# Patient Record
Sex: MALE | Race: WHITE | NOT HISPANIC OR LATINO | URBAN - METROPOLITAN AREA
[De-identification: names, ages, dates, MRNs, and addresses within clinical notes are randomized per-mention and may not be internally consistent; named-entity substitution may affect disease eponyms.]

---

## 2018-05-06 ENCOUNTER — HOSPITAL ENCOUNTER (INPATIENT)
Facility: HOSPITAL | Age: 38
LOS: 3 days | Discharge: HOME | DRG: 137 | End: 2018-05-09
Attending: EMERGENCY MEDICINE | Admitting: EMERGENCY MEDICINE
Payer: COMMERCIAL

## 2018-05-06 DIAGNOSIS — R07.9 CHEST PAIN: ICD-10-CM

## 2018-05-06 DIAGNOSIS — I10 ESSENTIAL HYPERTENSION: ICD-10-CM

## 2018-05-06 DIAGNOSIS — L02.01 FACIAL ABSCESS: Primary | ICD-10-CM

## 2018-05-06 DIAGNOSIS — L02.11 ABSCESS OF NECK: ICD-10-CM

## 2018-05-06 DIAGNOSIS — E11.9 DIABETES MELLITUS TYPE II, NON INSULIN DEPENDENT: ICD-10-CM

## 2018-05-06 PROBLEM — E13.9 DIABETES 1.5, MANAGED AS TYPE 2: Chronic | Status: ACTIVE | Noted: 2018-05-06

## 2018-05-06 LAB
ALBUMIN SERPL BCP-MCNC: 3.3 G/DL
ALP SERPL-CCNC: 119 U/L
ALT SERPL W/O P-5'-P-CCNC: 41 U/L
ANION GAP SERPL CALC-SCNC: 10 MMOL/L
AST SERPL-CCNC: 23 U/L
BASOPHILS # BLD AUTO: 0.03 K/UL
BASOPHILS NFR BLD: 0.2 %
BILIRUB SERPL-MCNC: 0.5 MG/DL
BUN SERPL-MCNC: 7 MG/DL
CALCIUM SERPL-MCNC: 8.7 MG/DL
CHLORIDE SERPL-SCNC: 96 MMOL/L
CO2 SERPL-SCNC: 22 MMOL/L
CREAT SERPL-MCNC: 0.9 MG/DL
DIFFERENTIAL METHOD: ABNORMAL
EOSINOPHIL # BLD AUTO: 0.1 K/UL
EOSINOPHIL NFR BLD: 0.5 %
ERYTHROCYTE [DISTWIDTH] IN BLOOD BY AUTOMATED COUNT: 13.6 %
EST. GFR  (AFRICAN AMERICAN): >60 ML/MIN/1.73 M^2
EST. GFR  (NON AFRICAN AMERICAN): >60 ML/MIN/1.73 M^2
GLUCOSE SERPL-MCNC: 285 MG/DL (ref 70–110)
GLUCOSE SERPL-MCNC: 431 MG/DL (ref 70–110)
GLUCOSE SERPL-MCNC: 432 MG/DL
HCT VFR BLD AUTO: 41.6 %
HGB BLD-MCNC: 14.4 G/DL
IMM GRANULOCYTES # BLD AUTO: 0.07 K/UL
IMM GRANULOCYTES NFR BLD AUTO: 0.5 %
LYMPHOCYTES # BLD AUTO: 1.7 K/UL
LYMPHOCYTES NFR BLD: 13.3 %
MCH RBC QN AUTO: 29.3 PG
MCHC RBC AUTO-ENTMCNC: 34.6 G/DL
MCV RBC AUTO: 85 FL
MONOCYTES # BLD AUTO: 1 K/UL
MONOCYTES NFR BLD: 7.7 %
NEUTROPHILS # BLD AUTO: 9.9 K/UL
NEUTROPHILS NFR BLD: 77.8 %
NRBC BLD-RTO: 0 /100 WBC
PLATELET # BLD AUTO: 278 K/UL
PMV BLD AUTO: 10.8 FL
POCT GLUCOSE: 200 MG/DL (ref 70–110)
POCT GLUCOSE: 285 MG/DL (ref 70–110)
POCT GLUCOSE: 431 MG/DL (ref 70–110)
POTASSIUM SERPL-SCNC: 4.3 MMOL/L
PROT SERPL-MCNC: 7.3 G/DL
RBC # BLD AUTO: 4.92 M/UL
SODIUM SERPL-SCNC: 128 MMOL/L
WBC # BLD AUTO: 12.81 K/UL

## 2018-05-06 PROCEDURE — 99285 EMERGENCY DEPT VISIT HI MDM: CPT | Mod: 25

## 2018-05-06 PROCEDURE — G0378 HOSPITAL OBSERVATION PER HR: HCPCS

## 2018-05-06 PROCEDURE — 70491 CT SOFT TISSUE NECK W/DYE: CPT | Mod: TC

## 2018-05-06 PROCEDURE — 96361 HYDRATE IV INFUSION ADD-ON: CPT

## 2018-05-06 PROCEDURE — 85025 COMPLETE CBC W/AUTO DIFF WBC: CPT

## 2018-05-06 PROCEDURE — 63600175 PHARM REV CODE 636 W HCPCS: Performed by: EMERGENCY MEDICINE

## 2018-05-06 PROCEDURE — 25000003 PHARM REV CODE 250: Performed by: INTERNAL MEDICINE

## 2018-05-06 PROCEDURE — 63600175 PHARM REV CODE 636 W HCPCS

## 2018-05-06 PROCEDURE — 63600175 PHARM REV CODE 636 W HCPCS: Performed by: INTERNAL MEDICINE

## 2018-05-06 PROCEDURE — 83036 HEMOGLOBIN GLYCOSYLATED A1C: CPT

## 2018-05-06 PROCEDURE — 80053 COMPREHEN METABOLIC PANEL: CPT

## 2018-05-06 PROCEDURE — 25000003 PHARM REV CODE 250: Performed by: EMERGENCY MEDICINE

## 2018-05-06 PROCEDURE — 96366 THER/PROPH/DIAG IV INF ADDON: CPT

## 2018-05-06 PROCEDURE — 96372 THER/PROPH/DIAG INJ SC/IM: CPT | Mod: 59

## 2018-05-06 PROCEDURE — 82962 GLUCOSE BLOOD TEST: CPT

## 2018-05-06 PROCEDURE — 25500020 PHARM REV CODE 255: Performed by: EMERGENCY MEDICINE

## 2018-05-06 PROCEDURE — 96375 TX/PRO/DX INJ NEW DRUG ADDON: CPT

## 2018-05-06 PROCEDURE — S0077 INJECTION, CLINDAMYCIN PHOSP: HCPCS | Performed by: INTERNAL MEDICINE

## 2018-05-06 PROCEDURE — 11000001 HC ACUTE MED/SURG PRIVATE ROOM

## 2018-05-06 PROCEDURE — 87040 BLOOD CULTURE FOR BACTERIA: CPT

## 2018-05-06 PROCEDURE — 96365 THER/PROPH/DIAG IV INF INIT: CPT

## 2018-05-06 PROCEDURE — 70491 CT SOFT TISSUE NECK W/DYE: CPT | Mod: 26,,, | Performed by: RADIOLOGY

## 2018-05-06 PROCEDURE — 96374 THER/PROPH/DIAG INJ IV PUSH: CPT

## 2018-05-06 RX ORDER — PIPERACILLIN SODIUM, TAZOBACTAM SODIUM 4; .5 G/20ML; G/20ML
INJECTION, POWDER, LYOPHILIZED, FOR SOLUTION INTRAVENOUS
Status: COMPLETED
Start: 2018-05-06 | End: 2018-05-06

## 2018-05-06 RX ORDER — VANCOMYCIN 2 GRAM/500 ML IN 0.9 % SODIUM CHLORIDE INTRAVENOUS
2000
Status: DISCONTINUED | OUTPATIENT
Start: 2018-05-06 | End: 2018-05-06

## 2018-05-06 RX ORDER — GLUCAGON 1 MG
1 KIT INJECTION
Status: DISCONTINUED | OUTPATIENT
Start: 2018-05-06 | End: 2018-05-09 | Stop reason: HOSPADM

## 2018-05-06 RX ORDER — ACETAMINOPHEN 325 MG/1
650 TABLET ORAL EVERY 4 HOURS PRN
Status: DISCONTINUED | OUTPATIENT
Start: 2018-05-06 | End: 2018-05-09 | Stop reason: HOSPADM

## 2018-05-06 RX ORDER — SODIUM CHLORIDE 9 MG/ML
INJECTION, SOLUTION INTRAVENOUS CONTINUOUS
Status: DISCONTINUED | OUTPATIENT
Start: 2018-05-06 | End: 2018-05-07

## 2018-05-06 RX ORDER — MORPHINE SULFATE 4 MG/ML
2 INJECTION, SOLUTION INTRAMUSCULAR; INTRAVENOUS EVERY 4 HOURS PRN
Status: DISCONTINUED | OUTPATIENT
Start: 2018-05-06 | End: 2018-05-09 | Stop reason: HOSPADM

## 2018-05-06 RX ORDER — ONDANSETRON 2 MG/ML
4 INJECTION INTRAMUSCULAR; INTRAVENOUS EVERY 6 HOURS PRN
Status: DISCONTINUED | OUTPATIENT
Start: 2018-05-06 | End: 2018-05-09 | Stop reason: HOSPADM

## 2018-05-06 RX ORDER — CLONIDINE HYDROCHLORIDE 0.1 MG/1
TABLET ORAL
Status: COMPLETED
Start: 2018-05-06 | End: 2018-05-06

## 2018-05-06 RX ORDER — CLINDAMYCIN PHOSPHATE 900 MG/50ML
900 INJECTION, SOLUTION INTRAVENOUS
Status: DISCONTINUED | OUTPATIENT
Start: 2018-05-06 | End: 2018-05-09 | Stop reason: HOSPADM

## 2018-05-06 RX ORDER — METFORMIN HYDROCHLORIDE 500 MG/1
500 TABLET ORAL 2 TIMES DAILY WITH MEALS
COMMUNITY

## 2018-05-06 RX ORDER — ONDANSETRON 2 MG/ML
4 INJECTION INTRAMUSCULAR; INTRAVENOUS
Status: COMPLETED | OUTPATIENT
Start: 2018-05-06 | End: 2018-05-06

## 2018-05-06 RX ORDER — CLONIDINE HYDROCHLORIDE 0.1 MG/1
0.2 TABLET ORAL 2 TIMES DAILY PRN
Status: DISCONTINUED | OUTPATIENT
Start: 2018-05-06 | End: 2018-05-09 | Stop reason: HOSPADM

## 2018-05-06 RX ORDER — MORPHINE SULFATE 4 MG/ML
8 INJECTION, SOLUTION INTRAMUSCULAR; INTRAVENOUS
Status: COMPLETED | OUTPATIENT
Start: 2018-05-06 | End: 2018-05-06

## 2018-05-06 RX ORDER — INSULIN ASPART 100 [IU]/ML
1-10 INJECTION, SOLUTION INTRAVENOUS; SUBCUTANEOUS EVERY 6 HOURS PRN
Status: DISCONTINUED | OUTPATIENT
Start: 2018-05-06 | End: 2018-05-09 | Stop reason: HOSPADM

## 2018-05-06 RX ADMIN — ACETAMINOPHEN 650 MG: 325 TABLET, FILM COATED ORAL at 07:05

## 2018-05-06 RX ADMIN — INSULIN HUMAN 10 UNITS: 100 INJECTION, SOLUTION PARENTERAL at 10:05

## 2018-05-06 RX ADMIN — IOHEXOL 71 ML: 350 INJECTION, SOLUTION INTRAVENOUS at 10:05

## 2018-05-06 RX ADMIN — PIPERACILLIN SODIUM AND TAZOBACTAM SODIUM 4.5 G: 2; .25 INJECTION, POWDER, FOR SOLUTION INTRAVENOUS at 07:05

## 2018-05-06 RX ADMIN — SODIUM CHLORIDE 1000 ML: 9 INJECTION, SOLUTION INTRAVENOUS at 09:05

## 2018-05-06 RX ADMIN — MORPHINE SULFATE 8 MG: 4 INJECTION INTRAVENOUS at 09:05

## 2018-05-06 RX ADMIN — MORPHINE SULFATE 2 MG: 4 INJECTION INTRAVENOUS at 02:05

## 2018-05-06 RX ADMIN — MORPHINE SULFATE 2 MG: 4 INJECTION INTRAVENOUS at 07:05

## 2018-05-06 RX ADMIN — ONDANSETRON 4 MG: 2 INJECTION INTRAMUSCULAR; INTRAVENOUS at 07:05

## 2018-05-06 RX ADMIN — ONDANSETRON 4 MG: 2 INJECTION INTRAMUSCULAR; INTRAVENOUS at 09:05

## 2018-05-06 RX ADMIN — CLINDAMYCIN IN 5 PERCENT DEXTROSE 900 MG: 18 INJECTION, SOLUTION INTRAVENOUS at 03:05

## 2018-05-06 RX ADMIN — PIPERACILLIN SODIUM AND TAZOBACTAM SODIUM 4.5 G: 2; .25 INJECTION, POWDER, FOR SOLUTION INTRAVENOUS at 01:05

## 2018-05-06 RX ADMIN — PIPERACILLIN AND TAZOBACTAM 4.5 G: 4; .5 INJECTION, POWDER, LYOPHILIZED, FOR SOLUTION INTRAVENOUS; PARENTERAL at 08:05

## 2018-05-06 RX ADMIN — CLONIDINE HYDROCHLORIDE 0.2 MG: 0.1 TABLET ORAL at 07:05

## 2018-05-06 RX ADMIN — INSULIN ASPART 2 UNITS: 100 INJECTION, SOLUTION INTRAVENOUS; SUBCUTANEOUS at 04:05

## 2018-05-06 RX ADMIN — CLINDAMYCIN IN 5 PERCENT DEXTROSE 900 MG: 18 INJECTION, SOLUTION INTRAVENOUS at 09:05

## 2018-05-06 RX ADMIN — SODIUM CHLORIDE: 900 INJECTION, SOLUTION INTRAVENOUS at 01:05

## 2018-05-06 NOTE — ED PROVIDER NOTES
Encounter Date: 5/6/2018       History     Chief Complaint   Patient presents with    Facial Swelling     patient drained abscess from same place 2 mo ago.      3 days of progressive left facial pain and swelling with progression of the swelling now to the right submandibular region    Medically non compliant with anti hypertensive meds and oral NIDDM meds    No known fever  No airway symptoms of SOB, stridor     Reports no recent dental procedures  Denies any acute dental pain as potential source of infection              Review of patient's allergies indicates:  No Known Allergies  Past Medical History:   Diagnosis Date    Diabetes mellitus     Hypertension      Past Surgical History:   Procedure Laterality Date    CHOLECYSTECTOMY       History reviewed. No pertinent family history.  Social History   Substance Use Topics    Smoking status: Current Some Day Smoker    Smokeless tobacco: Not on file    Alcohol use No     Review of Systems   Constitutional: Positive for appetite change. Negative for fever.   HENT: Positive for facial swelling. Negative for ear discharge, ear pain, sinus pain, sinus pressure, sneezing, sore throat, tinnitus, trouble swallowing and voice change.    Eyes: Negative.    Respiratory: Negative.    Cardiovascular: Negative.    Gastrointestinal: Negative.    Endocrine: Positive for polydipsia and polyuria.   Genitourinary: Negative for difficulty urinating, dysuria, hematuria and urgency.   Musculoskeletal: Negative.  Negative for neck pain and neck stiffness.   Hematological: Negative.    All other systems reviewed and are negative.      Physical Exam     Initial Vitals [05/06/18 0838]   BP Pulse Resp Temp SpO2   (!) 176/118 (!) 115 20 97.6 °F (36.4 °C) 95 %      MAP       137.33         Physical Exam    Nursing note and vitals reviewed.  Constitutional: He appears well-developed and well-nourished. He is not diaphoretic. No distress.   HENT:   Head: Normocephalic and atraumatic.        Mouth/Throat: Oropharynx is clear and moist and mucous membranes are normal. No dental abscesses or uvula swelling.   Eyes: Conjunctivae and EOM are normal. Pupils are equal, round, and reactive to light.   Neck: Neck supple. Carotid bruit is not present. No JVD present.   Cardiovascular: Normal rate, regular rhythm, normal heart sounds and intact distal pulses.  No extrasystoles are present. Exam reveals no gallop and no friction rub.    No murmur heard.  Pulses:       Radial pulses are 2+ on the right side, and 2+ on the left side.   Pulmonary/Chest: Breath sounds normal. No respiratory distress. He has no decreased breath sounds. He has no wheezes. He has no rhonchi. He has no rales. He exhibits no tenderness.   Abdominal: Soft. Bowel sounds are normal. He exhibits no distension and no mass. There is no tenderness. There is no rebound and no guarding.   Musculoskeletal: Normal range of motion. He exhibits no edema or tenderness.   Neurological: He is alert and oriented to person, place, and time. He has normal strength. No sensory deficit.   Skin: Skin is warm and dry. Capillary refill takes less than 2 seconds. No rash noted. No erythema. No pallor.   Psychiatric: He has a normal mood and affect. His behavior is normal. Judgment and thought content normal.         ED Course   Procedures  Labs Reviewed   CBC W/ AUTO DIFFERENTIAL - Abnormal; Notable for the following:        Result Value    WBC 12.81 (*)     Gran # (ANC) 9.9 (*)     Immature Grans (Abs) 0.07 (*)     Gran% 77.8 (*)     Lymph% 13.3 (*)     All other components within normal limits   POCT GLUCOSE MONITORING CONTINUOUS - Abnormal; Notable for the following:     POC Glucose 431 (*)     All other components within normal limits   POCT GLUCOSE - Abnormal; Notable for the following:     POCT Glucose 431 (*)     All other components within normal limits   CULTURE, BLOOD   COMPREHENSIVE METABOLIC PANEL        Imaging Results          CT Soft Tissue  Neck With Contrast (Final result)  Result time 05/06/18 11:16:41    Final result by Osmar Fernandes MD (05/06/18 11:16:41)                 Impression:      Intramuscular abscess within the left masseter muscle, likely associated with left mandibular 3rd molar periapical abscess.  Associated cellulitis as above.      Electronically signed by: Osmar Fernandes MD  Date:    05/06/2018  Time:    11:16             Narrative:    EXAMINATION:  CT SOFT TISSUE NECK WITH CONTRAST    CLINICAL HISTORY:  abscess;    TECHNIQUE:  Low dose axial images as well as sagittal and coronal reconstructions were performed from the skull base to the clavicles following the intravenous administration of 75 mL of Omnipaque 350.    COMPARISON:  None    FINDINGS:  There is a peripherally enhancing fluid collection present centered within the left masseter muscle, measuring 3.7 x 3.7 cm.  There is moderate surrounding soft tissue stranding and thickening of the overlying platysma muscle.  There is generalized subcutaneous fat strict needing and skin thickening of the left face/neck, which extends across midline to the right submandibular region.    There is periapical abscess formation about the left mandibular 3rd molar, in close proximity to the abscess.    The major vascular structures of the neck are patent.  Prominent left cervical lymph nodes are present without lilliam lymphadenopathy.  There is a 2.3 cm mucous retention cyst within the left maxillary sinus.                                   Medical Decision Making:   Other:   I have discussed this case with another health care provider.       <> Summary of the Discussion: Lorenza Meyer, and German     Pt is admitted to [facility] with ENT consult in am so to plan OR for I and DPatricia SUAZO                      Clinical Impression:         The primary encounter diagnosis was Facial abscess. Diagnoses of Chest pain, Diabetes mellitus type II, non insulin dependent, and Essential  hypertension were also pertinent to this visit.                   Long Whatley MD  05/06/18 2464

## 2018-05-06 NOTE — H&P
[facility] - ED  Hospital Medicine  History & Physical    Patient Name: Leonila Simon  MRN: [id number]  Admission Date: 5/6/2018  Attending Physician: Miguel Angel Blackwell MD  Primary Care Provider: Primary Doctor No         Patient information was obtained from patient and ER records.     Subjective:     Principal Problem:<principal problem not specified>    Chief Complaint:   Chief Complaint   Patient presents with    Facial Swelling     patient drained abscess from same place 2 mo ago.         HPI: Patient presents with a 4 day history of swelling to the left jaw and pain.   He stated that he had something similar 3 months ago that started draining on its own.   That subsequently healed and now the left jaw has been increasing in size for the past 4 days.  He states that it is starting to hurt under his chin to the right.  CT scan shows significant abscess of the Left masseter muscle.    Past Medical History:   Diagnosis Date    Diabetes mellitus     Hypertension        Past Surgical History:   Procedure Laterality Date    CHOLECYSTECTOMY         Review of patient's allergies indicates:  No Known Allergies    No current facility-administered medications on file prior to encounter.      No current outpatient prescriptions on file prior to encounter.     Family History     None        Social History Main Topics    Smoking status: Current Some Day Smoker    Smokeless tobacco: Not on file    Alcohol use No    Drug use: No    Sexual activity: Not Currently     Review of Systems   Constitutional: Positive for fatigue and fever. Negative for activity change, appetite change, chills and unexpected weight change.   HENT: Positive for dental problem, facial swelling (Left jaw and neck), rhinorrhea and sinus pain. Negative for congestion, drooling, ear discharge, ear pain, hearing loss, mouth sores, nosebleeds, postnasal drip and trouble swallowing.    Eyes: Negative.    Respiratory: Negative.     Cardiovascular: Negative.    Gastrointestinal: Negative.    Endocrine: Negative for cold intolerance, heat intolerance, polydipsia, polyphagia and polyuria.   Genitourinary: Positive for hematuria. Negative for dysuria, enuresis, flank pain, frequency and genital sores.   Musculoskeletal: Positive for arthralgias, neck pain and neck stiffness. Negative for back pain, gait problem, joint swelling and myalgias.   Allergic/Immunologic: Negative.    Neurological: Negative.    Hematological: Negative.    Psychiatric/Behavioral: Negative.      Objective:     Vital Signs (Most Recent):  Temp: 98.6 °F (37 °C) (05/06/18 1232)  Pulse: 106 (05/06/18 1002)  Resp: 20 (05/06/18 0838)  BP: (!) 157/109 (05/06/18 1002)  SpO2: (!) 92 % (05/06/18 1002) Vital Signs (24h Range):  Temp:  [97.6 °F (36.4 °C)-98.6 °F (37 °C)] 98.6 °F (37 °C)  Pulse:  [106-115] 106  Resp:  [20] 20  SpO2:  [92 %-95 %] 92 %  BP: (157-177)/(109-118) 157/109     Weight: 136.1 kg (300 lb)  Body mass index is 43.05 kg/m².    Physical Exam   Constitutional: He is oriented to person, place, and time. He appears well-developed and well-nourished.   HENT:   Head: Normocephalic.   Eyes: Conjunctivae and EOM are normal. Pupils are equal, round, and reactive to light.   Neck: No tracheal deviation present. No thyromegaly present.   Cardiovascular: Normal rate, regular rhythm, normal heart sounds and intact distal pulses.  Exam reveals no gallop and no friction rub.    No murmur heard.  Pulmonary/Chest: Effort normal and breath sounds normal. No stridor.   Abdominal: Soft.   Musculoskeletal: Normal range of motion.   Lymphadenopathy:     He has cervical adenopathy.   Neurological: He is alert and oriented to person, place, and time.   Skin: Skin is warm and dry. Capillary refill takes less than 2 seconds. There is erythema.   Psychiatric: He has a normal mood and affect.         CRANIAL NERVES     CN III, IV, VI   Pupils are equal, round, and reactive to  light.  Extraocular motions are normal.        Significant Labs: All pertinent labs within the past 24 hours have been reviewed.    Significant Imaging: CT: I have reviewed all pertinent results/findings within the past 24 hours and my personal findings are:  Agree with finding    Assessment/Plan:     Diabetes 1.5, managed as type 2    Place on Sliding scale and keep NPO until after seeing Dr. Corey Bletran of neck    Begin Zosyn and Vancomycin for Empiric Coverage  Consult General Surgery for evaluation and surgical treatment if needed            VTE Risk Mitigation     None             Miguel Angel Blackwell MD  Department of Hospital Medicine   [facility] - ED

## 2018-05-06 NOTE — ASSESSMENT & PLAN NOTE
Begin Zosyn and Vancomycin for Empiric Coverage  Consult General Surgery for evaluation and surgical treatment if needed

## 2018-05-06 NOTE — SUBJECTIVE & OBJECTIVE
Past Medical History:   Diagnosis Date    Diabetes mellitus     Hypertension        Past Surgical History:   Procedure Laterality Date    CHOLECYSTECTOMY         Review of patient's allergies indicates:  No Known Allergies    No current facility-administered medications on file prior to encounter.      No current outpatient prescriptions on file prior to encounter.     Family History     None        Social History Main Topics    Smoking status: Current Some Day Smoker    Smokeless tobacco: Not on file    Alcohol use No    Drug use: No    Sexual activity: Not Currently     Review of Systems   Constitutional: Positive for fatigue and fever. Negative for activity change, appetite change, chills and unexpected weight change.   HENT: Positive for dental problem, facial swelling (Left jaw and neck), rhinorrhea and sinus pain. Negative for congestion, drooling, ear discharge, ear pain, hearing loss, mouth sores, nosebleeds, postnasal drip and trouble swallowing.    Eyes: Negative.    Respiratory: Negative.    Cardiovascular: Negative.    Gastrointestinal: Negative.    Endocrine: Negative for cold intolerance, heat intolerance, polydipsia, polyphagia and polyuria.   Genitourinary: Positive for hematuria. Negative for dysuria, enuresis, flank pain, frequency and genital sores.   Musculoskeletal: Positive for arthralgias, neck pain and neck stiffness. Negative for back pain, gait problem, joint swelling and myalgias.   Allergic/Immunologic: Negative.    Neurological: Negative.    Hematological: Negative.    Psychiatric/Behavioral: Negative.      Objective:     Vital Signs (Most Recent):  Temp: 98.6 °F (37 °C) (05/06/18 1232)  Pulse: 106 (05/06/18 1002)  Resp: 20 (05/06/18 0838)  BP: (!) 157/109 (05/06/18 1002)  SpO2: (!) 92 % (05/06/18 1002) Vital Signs (24h Range):  Temp:  [97.6 °F (36.4 °C)-98.6 °F (37 °C)] 98.6 °F (37 °C)  Pulse:  [106-115] 106  Resp:  [20] 20  SpO2:  [92 %-95 %] 92 %  BP: (157-177)/(109-118)  157/109     Weight: 136.1 kg (300 lb)  Body mass index is 43.05 kg/m².    Physical Exam   Constitutional: He is oriented to person, place, and time. He appears well-developed and well-nourished.   HENT:   Head: Normocephalic.   Eyes: Conjunctivae and EOM are normal. Pupils are equal, round, and reactive to light.   Neck: No tracheal deviation present. No thyromegaly present.   Cardiovascular: Normal rate, regular rhythm, normal heart sounds and intact distal pulses.  Exam reveals no gallop and no friction rub.    No murmur heard.  Pulmonary/Chest: Effort normal and breath sounds normal. No stridor.   Abdominal: Soft.   Musculoskeletal: Normal range of motion.   Lymphadenopathy:     He has cervical adenopathy.   Neurological: He is alert and oriented to person, place, and time.   Skin: Skin is warm and dry. Capillary refill takes less than 2 seconds. There is erythema.   Psychiatric: He has a normal mood and affect.         CRANIAL NERVES     CN III, IV, VI   Pupils are equal, round, and reactive to light.  Extraocular motions are normal.        Significant Labs: All pertinent labs within the past 24 hours have been reviewed.    Significant Imaging: CT: I have reviewed all pertinent results/findings within the past 24 hours and my personal findings are:  Agree with finding

## 2018-05-06 NOTE — HPI
Patient presents with a 4 day history of swelling to the left jaw and pain.   He stated that he had something similar 3 months ago that started draining on its own.   That subsequently healed and now the left jaw has been increasing in size for the past 4 days.  He states that it is starting to hurt under his chin to the right.  CT scan shows significant abscess of the Left masseter muscle.

## 2018-05-07 ENCOUNTER — ANESTHESIA EVENT (OUTPATIENT)
Dept: SURGERY | Facility: HOSPITAL | Age: 38
DRG: 137 | End: 2018-05-07
Payer: COMMERCIAL

## 2018-05-07 ENCOUNTER — ANESTHESIA (OUTPATIENT)
Dept: SURGERY | Facility: HOSPITAL | Age: 38
DRG: 137 | End: 2018-05-07
Payer: COMMERCIAL

## 2018-05-07 PROBLEM — L02.01 FACIAL ABSCESS: Status: ACTIVE | Noted: 2018-05-07

## 2018-05-07 LAB
ESTIMATED AVG GLUCOSE: 212 MG/DL
HBA1C MFR BLD HPLC: 9 %
POCT GLUCOSE: 186 MG/DL (ref 70–110)
POCT GLUCOSE: 191 MG/DL (ref 70–110)
POCT GLUCOSE: 197 MG/DL (ref 70–110)
POCT GLUCOSE: 219 MG/DL (ref 70–110)
POCT GLUCOSE: 229 MG/DL (ref 70–110)
POCT GLUCOSE: 308 MG/DL (ref 70–110)

## 2018-05-07 PROCEDURE — 36000704 HC OR TIME LEV I 1ST 15 MIN

## 2018-05-07 PROCEDURE — 37000009 HC ANESTHESIA EA ADD 15 MINS

## 2018-05-07 PROCEDURE — 36000705 HC OR TIME LEV I EA ADD 15 MIN

## 2018-05-07 PROCEDURE — 63600175 PHARM REV CODE 636 W HCPCS: Performed by: NURSE ANESTHETIST, CERTIFIED REGISTERED

## 2018-05-07 PROCEDURE — 71000033 HC RECOVERY, INTIAL HOUR

## 2018-05-07 PROCEDURE — D9220A PRA ANESTHESIA: Mod: ,,, | Performed by: ANESTHESIOLOGY

## 2018-05-07 PROCEDURE — 87076 CULTURE ANAEROBE IDENT EACH: CPT

## 2018-05-07 PROCEDURE — 63600175 PHARM REV CODE 636 W HCPCS: Performed by: INTERNAL MEDICINE

## 2018-05-07 PROCEDURE — 25000003 PHARM REV CODE 250: Performed by: INTERNAL MEDICINE

## 2018-05-07 PROCEDURE — 71000039 HC RECOVERY, EACH ADD'L HOUR

## 2018-05-07 PROCEDURE — 87075 CULTR BACTERIA EXCEPT BLOOD: CPT

## 2018-05-07 PROCEDURE — 0W9500Z DRAINAGE OF LOWER JAW WITH DRAINAGE DEVICE, OPEN APPROACH: ICD-10-PCS

## 2018-05-07 PROCEDURE — 37000008 HC ANESTHESIA 1ST 15 MINUTES

## 2018-05-07 PROCEDURE — 25000003 PHARM REV CODE 250: Performed by: NURSE ANESTHETIST, CERTIFIED REGISTERED

## 2018-05-07 PROCEDURE — 87070 CULTURE OTHR SPECIMN AEROBIC: CPT

## 2018-05-07 PROCEDURE — 11000001 HC ACUTE MED/SURG PRIVATE ROOM

## 2018-05-07 PROCEDURE — 25000003 PHARM REV CODE 250

## 2018-05-07 PROCEDURE — S0077 INJECTION, CLINDAMYCIN PHOSP: HCPCS | Performed by: INTERNAL MEDICINE

## 2018-05-07 RX ORDER — LIDOCAINE HYDROCHLORIDE 10 MG/ML
1 INJECTION, SOLUTION EPIDURAL; INFILTRATION; INTRACAUDAL; PERINEURAL ONCE
Status: DISCONTINUED | OUTPATIENT
Start: 2018-05-07 | End: 2018-05-07 | Stop reason: HOSPADM

## 2018-05-07 RX ORDER — DIPHENHYDRAMINE HYDROCHLORIDE 50 MG/ML
12.5 INJECTION INTRAMUSCULAR; INTRAVENOUS
Status: DISCONTINUED | OUTPATIENT
Start: 2018-05-07 | End: 2018-05-07 | Stop reason: HOSPADM

## 2018-05-07 RX ORDER — ONDANSETRON 2 MG/ML
4 INJECTION INTRAMUSCULAR; INTRAVENOUS DAILY PRN
Status: DISCONTINUED | OUTPATIENT
Start: 2018-05-07 | End: 2018-05-07 | Stop reason: HOSPADM

## 2018-05-07 RX ORDER — KETAMINE HYDROCHLORIDE 50 MG/ML
INJECTION, SOLUTION INTRAMUSCULAR; INTRAVENOUS
Status: DISCONTINUED | OUTPATIENT
Start: 2018-05-07 | End: 2018-05-07

## 2018-05-07 RX ORDER — HYDRALAZINE HYDROCHLORIDE 20 MG/ML
INJECTION INTRAMUSCULAR; INTRAVENOUS
Status: DISCONTINUED | OUTPATIENT
Start: 2018-05-07 | End: 2018-05-07

## 2018-05-07 RX ORDER — INSULIN ASPART 100 [IU]/ML
1-10 INJECTION, SOLUTION INTRAVENOUS; SUBCUTANEOUS
Status: DISCONTINUED | OUTPATIENT
Start: 2018-05-07 | End: 2018-05-07

## 2018-05-07 RX ORDER — SODIUM CHLORIDE 9 MG/ML
INJECTION, SOLUTION INTRAVENOUS CONTINUOUS PRN
Status: DISCONTINUED | OUTPATIENT
Start: 2018-05-07 | End: 2018-05-07

## 2018-05-07 RX ORDER — MORPHINE SULFATE 4 MG/ML
2 INJECTION, SOLUTION INTRAMUSCULAR; INTRAVENOUS ONCE
Status: COMPLETED | OUTPATIENT
Start: 2018-05-07 | End: 2018-05-07

## 2018-05-07 RX ORDER — SODIUM CHLORIDE, SODIUM LACTATE, POTASSIUM CHLORIDE, CALCIUM CHLORIDE 600; 310; 30; 20 MG/100ML; MG/100ML; MG/100ML; MG/100ML
125 INJECTION, SOLUTION INTRAVENOUS CONTINUOUS
Status: DISCONTINUED | OUTPATIENT
Start: 2018-05-07 | End: 2018-05-07

## 2018-05-07 RX ORDER — LISINOPRIL 10 MG/1
10 TABLET ORAL DAILY
Status: DISCONTINUED | OUTPATIENT
Start: 2018-05-08 | End: 2018-05-09 | Stop reason: HOSPADM

## 2018-05-07 RX ORDER — GLIMEPIRIDE 2 MG/1
4 TABLET ORAL
Status: DISCONTINUED | OUTPATIENT
Start: 2018-05-08 | End: 2018-05-09 | Stop reason: HOSPADM

## 2018-05-07 RX ORDER — METFORMIN HYDROCHLORIDE 500 MG/1
500 TABLET ORAL 2 TIMES DAILY WITH MEALS
Status: DISCONTINUED | OUTPATIENT
Start: 2018-05-08 | End: 2018-05-09 | Stop reason: HOSPADM

## 2018-05-07 RX ORDER — GLYCOPYRROLATE 0.2 MG/ML
INJECTION INTRAMUSCULAR; INTRAVENOUS
Status: DISCONTINUED | OUTPATIENT
Start: 2018-05-07 | End: 2018-05-07

## 2018-05-07 RX ORDER — METOPROLOL TARTRATE 1 MG/ML
INJECTION, SOLUTION INTRAVENOUS
Status: DISCONTINUED | OUTPATIENT
Start: 2018-05-07 | End: 2018-05-07

## 2018-05-07 RX ORDER — LIDOCAINE HCL/EPINEPHRINE/PF 2%-1:200K
VIAL (ML) INJECTION
Status: DISCONTINUED | OUTPATIENT
Start: 2018-05-07 | End: 2018-05-07 | Stop reason: HOSPADM

## 2018-05-07 RX ORDER — FAMOTIDINE 10 MG/ML
20 INJECTION INTRAVENOUS ONCE
Status: DISCONTINUED | OUTPATIENT
Start: 2018-05-07 | End: 2018-05-07

## 2018-05-07 RX ORDER — SODIUM CHLORIDE, SODIUM LACTATE, POTASSIUM CHLORIDE, CALCIUM CHLORIDE 600; 310; 30; 20 MG/100ML; MG/100ML; MG/100ML; MG/100ML
INJECTION, SOLUTION INTRAVENOUS CONTINUOUS
Status: DISCONTINUED | OUTPATIENT
Start: 2018-05-07 | End: 2018-05-07

## 2018-05-07 RX ORDER — MORPHINE SULFATE 4 MG/ML
2 INJECTION, SOLUTION INTRAMUSCULAR; INTRAVENOUS EVERY 5 MIN PRN
Status: DISCONTINUED | OUTPATIENT
Start: 2018-05-07 | End: 2018-05-07 | Stop reason: HOSPADM

## 2018-05-07 RX ORDER — KETOROLAC TROMETHAMINE 30 MG/ML
30 INJECTION, SOLUTION INTRAMUSCULAR; INTRAVENOUS EVERY 6 HOURS
Status: DISPENSED | OUTPATIENT
Start: 2018-05-07 | End: 2018-05-08

## 2018-05-07 RX ORDER — MIDAZOLAM HYDROCHLORIDE 1 MG/ML
INJECTION, SOLUTION INTRAMUSCULAR; INTRAVENOUS
Status: DISCONTINUED | OUTPATIENT
Start: 2018-05-07 | End: 2018-05-07

## 2018-05-07 RX ORDER — CLONIDINE HYDROCHLORIDE 0.1 MG/1
0.2 TABLET ORAL ONCE
Status: COMPLETED | OUTPATIENT
Start: 2018-05-07 | End: 2018-05-07

## 2018-05-07 RX ADMIN — ACETAMINOPHEN 650 MG: 325 TABLET, FILM COATED ORAL at 04:05

## 2018-05-07 RX ADMIN — SODIUM CHLORIDE: 900 INJECTION, SOLUTION INTRAVENOUS at 09:05

## 2018-05-07 RX ADMIN — KETAMINE HYDROCHLORIDE 20 MG: 50 INJECTION, SOLUTION INTRAMUSCULAR; INTRAVENOUS at 12:05

## 2018-05-07 RX ADMIN — CLONIDINE HYDROCHLORIDE 0.2 MG: 0.1 TABLET ORAL at 04:05

## 2018-05-07 RX ADMIN — METOPROLOL TARTRATE 1 MG: 5 INJECTION INTRAVENOUS at 12:05

## 2018-05-07 RX ADMIN — HYDRALAZINE HYDROCHLORIDE 10 MG: 20 INJECTION INTRAMUSCULAR; INTRAVENOUS at 12:05

## 2018-05-07 RX ADMIN — CLINDAMYCIN IN 5 PERCENT DEXTROSE 900 MG: 18 INJECTION, SOLUTION INTRAVENOUS at 02:05

## 2018-05-07 RX ADMIN — MORPHINE SULFATE 2 MG: 4 INJECTION INTRAVENOUS at 08:05

## 2018-05-07 RX ADMIN — METOPROLOL TARTRATE 2 MG: 5 INJECTION INTRAVENOUS at 12:05

## 2018-05-07 RX ADMIN — MORPHINE SULFATE 2 MG: 4 INJECTION INTRAVENOUS at 02:05

## 2018-05-07 RX ADMIN — PIPERACILLIN SODIUM AND TAZOBACTAM SODIUM 4.5 G: 2; .25 INJECTION, POWDER, FOR SOLUTION INTRAVENOUS at 03:05

## 2018-05-07 RX ADMIN — PIPERACILLIN SODIUM AND TAZOBACTAM SODIUM 4.5 G: 2; .25 INJECTION, POWDER, FOR SOLUTION INTRAVENOUS at 07:05

## 2018-05-07 RX ADMIN — MIDAZOLAM HYDROCHLORIDE 2 MG: 1 INJECTION, SOLUTION INTRAMUSCULAR; INTRAVENOUS at 12:05

## 2018-05-07 RX ADMIN — SODIUM CHLORIDE: 0.9 INJECTION, SOLUTION INTRAVENOUS at 12:05

## 2018-05-07 RX ADMIN — CLINDAMYCIN IN 5 PERCENT DEXTROSE 900 MG: 18 INJECTION, SOLUTION INTRAVENOUS at 05:05

## 2018-05-07 RX ADMIN — MORPHINE SULFATE 2 MG: 4 INJECTION INTRAVENOUS at 04:05

## 2018-05-07 RX ADMIN — GLYCOPYRROLATE 0.4 MG: 0.2 INJECTION INTRAMUSCULAR; INTRAVENOUS at 12:05

## 2018-05-07 RX ADMIN — CLINDAMYCIN IN 5 PERCENT DEXTROSE 900 MG: 18 INJECTION, SOLUTION INTRAVENOUS at 10:05

## 2018-05-07 RX ADMIN — ONDANSETRON 4 MG: 2 INJECTION INTRAMUSCULAR; INTRAVENOUS at 02:05

## 2018-05-07 RX ADMIN — MORPHINE SULFATE 2 MG: 4 INJECTION INTRAVENOUS at 01:05

## 2018-05-07 RX ADMIN — KETOROLAC TROMETHAMINE 30 MG: 30 INJECTION, SOLUTION INTRAMUSCULAR; INTRAVENOUS at 05:05

## 2018-05-07 RX ADMIN — PIPERACILLIN SODIUM AND TAZOBACTAM SODIUM 4.5 G: 2; .25 INJECTION, POWDER, FOR SOLUTION INTRAVENOUS at 12:05

## 2018-05-07 NOTE — TRANSFER OF CARE
"Anesthesia Transfer of Care Note    Patient: Leonila Simon    Procedure(s) Performed: Procedure(s) (LRB):  EXTRACTION-TOOTH (N/A)  INCISION AND DRAINAGE (I & D)-ABSCESS (N/A)    Patient location: PACU    Anesthesia Type: general    Transport from OR: Transported from OR on room air with adequate spontaneous ventilation    Post pain: adequate analgesia    Post assessment: no apparent anesthetic complications and tolerated procedure well    Post vital signs: stable    Level of consciousness: awake, alert and oriented    Nausea/Vomiting: no nausea/vomiting    Complications: none    Transfer of care protocol was followed      Last vitals:   Visit Vitals  BP (!) 159/109   Pulse 99   Temp 36.9 °C (98.5 °F) (Oral)   Resp (!) 24   Ht 5' 11" (1.803 m)   Wt 136.1 kg (300 lb)   SpO2 (!) 94%   BMI 41.84 kg/m²     "

## 2018-05-07 NOTE — CONSULTS
"  [facility] - Periop Services  Adult Nutrition  Consult Note    SUMMARY     Recommendations  Recommendation/Intervention: Recommend advancing diet to 2000 cammy ADA soft diet when medically appropriate.  Goal:  1) Pt will consume > 75% of meals  2) Pt will consume 9026-9663 mls fluid  Nutrition Goal Status: new    Reason for Assessment    Reason for Assessment: consult, out of control diabetes  Diagnosis: diabetes diagnosis/complications  General Information Comments:  (Pt admitted with abcess of the neck. Hx of diabetes, uncontrolled. admit glu 432.Smoker. Attempted to see patient. Mother in room. Patient told me to "just leave" because he was in pain. He agreed that I could leave written info on Diabetic Diet.        )    Nutrition Risk Screen    Nutrition Risk Screen: no indicators present    Nutrition/Diet History    Patient Reported Diet/Restrictions/Preferences: carbohydrate counting, diabetic diet (Full liquids)  Do you have any cultural, spiritual, Roman Catholic conflicts, given your current situation?: Jewish, denies needs  Food Allergies: NKFA  Factors Affecting Nutritional Intake: pain    Anthropometrics    Temp: 98.5 °F (36.9 °C)  Height Method: Stated  Height: 5' 11" (180.3 cm)  Height (inches): 71 in  Weight Method: Stated  Weight: 136.1 kg (300 lb)  Weight (lb): 300 lb  Ideal Body Weight (IBW), Male: 172 lb  % Ideal Body Weight, Male (lb): 174.42 lb  BMI (Calculated): 41.9  BMI Grade: greater than 40 - morbid obesity       Lab/Procedures/Meds     Pt labs reviewed      Physical Findings/Assessment    Overall Physical Appearance: obese    Estimated/Assessed Needs    Weight Used For Calorie Calculations: 136.1 kg (300 lb)  Energy Calorie Requirements (kcal): 2292 x 1.2= 2750 calories    6303-1751 for wt loss  Energy Need Method: Bacon- Ivanor  Protein Requirements: 109-136  (.8-1.0)  Weight Used For Protein Calculations: 136 kg (299 lb 13.2 oz)        RDA Method (mL): 2292   "       Nutrition Prescription Ordered    Current Diet Order:  ADA Full Liquid  Nutrition Order Comments: ADA Full Liquids    Evaluation of Received Nutrient/Fluid Intake    Energy Calories Required: not meeting needs  Protein Required: not meeting needs  Fluid Required: not meeting needs  % Intake of Estimated Energy Needs: Inadequate due to   Full Liq Diet  % Meal intake: 100% per documentation    .  Nutrition Risk    Level of Risk/Frequency of Follow-up: low - moderate    Assessment and Plan    Pt identified high risk R/T out of control blood glucose as evidenced by BG of 432.  Pt also at high nutritional risk R/T obesity ass evidenced by BMI of 43.14  .       Monitor and Evaluation    Food and Nutrient Intake: energy intake  Food and Nutrient Adminstration: diet order  Anthropometric Measurements: weight, weight change  Biochemical Data, Medical Tests and Procedures: inflammatory profile  Nutrition-Focused Physical Findings: overall appearance     Nutrition Follow-Up    RD Follow-up?: Yes  1x per week

## 2018-05-07 NOTE — PLAN OF CARE
05/07/18 1458   Discharge Assessment   Assessment Type Discharge Planning Assessment   Confirmed/corrected address and phone number on facesheet? Yes   Assessment information obtained from? Patient   Expected Length of Stay (days) 2   Communicated expected length of stay with patient/caregiver yes   Prior to hospitilization cognitive status: Alert/Oriented   Prior to hospitalization functional status: Independent   Current cognitive status: Alert/Oriented   Current Functional Status: Independent   Lives With parent(s)   Able to Return to Prior Arrangements yes   Is patient able to care for self after discharge? Yes   Who are your caregiver(s) and their phone number(s)? mother tiffany scott [phone]   Patient's perception of discharge disposition home or selfcare   Readmission Within The Last 30 Days no previous admission in last 30 days   Patient currently being followed by outpatient case management? No   Patient currently receives any other outside agency services? No   Equipment Currently Used at Home none   Do you have any problems affording any of your prescribed medications? No   Is the patient taking medications as prescribed? no   Dialysis Name and Scheduled days patient states he forgets his mother states that she will make sure pt takes his home medications as ordered going forward.   Does the patient receive services at the [facility]? No   Discharge Plan A Home   Patient/Family In Agreement With Plan yes

## 2018-05-07 NOTE — PLAN OF CARE
CM rounded on pt to complete discharge planning assessment. Patient recently returned from OR for I/D abscess.  Pt denies complaint at this time and CM able to complete assessment

## 2018-05-07 NOTE — SUBJECTIVE & OBJECTIVE
Left submandibular and submental abscess of probable dental etiology  Plan I and D   Will evaluate dental condition when trismus subsides and treat as necessary

## 2018-05-07 NOTE — ANESTHESIA POSTPROCEDURE EVALUATION
"Anesthesia Post Evaluation    Patient: Leonila Simon    Procedure(s) Performed: Procedure(s) (LRB):  EXTRACTION-TOOTH (N/A)  INCISION AND DRAINAGE (I & D)-ABSCESS (N/A)    Final Anesthesia Type: MAC  Patient location during evaluation: PACU  Patient participation: Yes- Able to Participate  Level of consciousness: awake and alert  Post-procedure vital signs: reviewed and stable  Pain management: adequate  Airway patency: patent  PONV status at discharge: No PONV  Anesthetic complications: no      Cardiovascular status: blood pressure returned to baseline  Respiratory status: unassisted  Hydration status: euvolemic  Follow-up not needed.        Visit Vitals  BP (!) 137/96   Pulse 106   Temp 36.9 °C (98.5 °F) (Oral)   Resp (!) 22   Ht 5' 11" (1.803 m)   Wt 136.1 kg (300 lb)   SpO2 98%   BMI 41.84 kg/m²       Pain/Anna Score: Pain Assessment Performed: Yes (5/7/2018  1:05 PM)  Presence of Pain: denies (5/7/2018  1:05 PM)  Pain Rating Prior to Med Admin: 9 (5/7/2018 11:10 AM)  Pain Rating Post Med Admin: 6 (5/7/2018 11:10 AM)  Anna Score: 10 (5/7/2018 12:50 PM)      "

## 2018-05-07 NOTE — PROGRESS NOTES
"Lying comfortably in bed post I&D.  "Hungry", no swallowing problems.  Pain better.    Vitals:    05/07/18 0428 05/07/18 0713 05/07/18 1018 05/07/18 1105   BP:  (!) 158/97 136/76 (!) 159/109   BP Location:  Right arm Right arm    Patient Position:  Lying Lying    Pulse:  92 84 99   Resp:  18 18 (!) 24   Temp: (!) 100.6 °F (38.1 °C) 99 °F (37.2 °C) 98.9 °F (37.2 °C) 98.5 °F (36.9 °C)   TempSrc:  Oral Oral Oral   SpO2:  96% 95% (!) 94%   Weight:    136.1 kg (300 lb)   Height:    5' 11" (1.803 m)     Alert x 4, looks comfortable, talking easily  Thick neck with moderate Left mandibular edema involving mild left malena-orbital edema; Drain in place left mid mandible  Regular  Clear  Obese and protuberant, benign  Trace edema  Non focal    A/P  1.Oral Abscess - admit WBC 12, to OR today for I&D with cultures sent.  Zosyn/clinda D#2.  CT "Intramuscular abscess within the left masseter muscle, likely associated with left mandibular 3rd molar periapical abscess".  Case d/w Beck Leavitt and Deangelo. Mandibular xray in AM with likely tooth extraction after swelling subsides.    2.DM - admit glucose >400; start LOVELL in AM in addition to metformin.  Non compliant with home meds and diet (drinks lots of coke). A1C 9%.  Dietary review.    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

## 2018-05-07 NOTE — PLAN OF CARE
Dr Leavitt at , in nad, resting well, icepack to left face resp even and unlabored on o2 3l n/c,voices no complaints

## 2018-05-07 NOTE — PLAN OF CARE
DR CORRIGAN EXPLAINED ANESTHESIA REGEIME FOR SURGERY TO PATIENT AND MOTHER. PATIENT THEN GOT VERY UPSET AND TRIED TO LEAVE THE PREOP AREA. MOTHER TOLD PATIENT TO GET BACK IN BED AND  PATIENT DID. REINFORCED DR CORRIGAN'S TEACHING TO PT AND MOTHER REGARDING ANESTHESIA. PT CALM NOW AND IS WAITING TO SPEAK WITH SURGEONS. NO SX CONSENTS HAVE BEEN SIGNED YET. RESTING QUIETLY IN NAD, B/P 147/107, P 99 R 16 O2 SAT ON ROOM AIR 95%, GLUCOSE 214, DR CORRIGAN AWARE OF PT'S CONDITION- NO NEW ORDERS GIVEN. MOTHER AT BS

## 2018-05-07 NOTE — ANESTHESIA PREPROCEDURE EVALUATION
05/07/2018  Leonila Simon is a 38 y.o., male.    Pre-op Assessment    I have reviewed the Patient Summary Reports.     I have reviewed the Nursing Notes.   I have reviewed the Medications.     Review of Systems  Social:  Smoker    Cardiovascular:   Hypertension    Endocrine:   Diabetes, type 2        Physical Exam  General:  Morbid Obesity    Airway/Jaw/Neck:  Airway Findings: Mouth Opening: < 3 cm General Airway Assessment: Adult  Mallampati: IV  Improves to IV with phonation.  TM Distance: Normal, at least 6 cm  Jaw/Neck Findings:  Neck ROM: Normal ROM       Chest/Lungs:  Chest/Lungs Findings: Clear to auscultation     Heart/Vascular:  Heart Findings: Rate: Normal  Rhythm: Regular Rhythm        Mental Status:  Mental Status Findings:  Cooperative, Alert and Oriented         Anesthesia Plan  Type of Anesthesia, risks & benefits discussed:  Anesthesia Type:  MAC  Patient's Preference:   Intra-op Monitoring Plan: standard ASA monitors  Intra-op Monitoring Plan Comments:   Post Op Pain Control Plan:   Post Op Pain Control Plan Comments:   Induction:    Beta Blocker:  Patient is not currently on a Beta-Blocker (No further documentation required).       Informed Consent: Patient understands risks and agrees with Anesthesia plan.  Questions answered. Anesthesia consent signed with patient.  ASA Score: 4     Day of Surgery Review of History & Physical: I have interviewed and examined the patient. I have reviewed the patient's H&P dated:        Anesthesia Plan Notes: I have discussed this at great length with the patient and his mother.  He understands that the abcess will be drained under local anesthesia with minimal sedation.  There is no way to intubate him without a fiberoptic intubating bronchoscope, which we do not have, or a tracheostomy.  I have discussed this at great length with Maranda Bright who  agrees.

## 2018-05-07 NOTE — PLAN OF CARE
Problem: Infection, Risk/Actual (Adult)  Intervention: Manage Suspected/Actual Infection   05/06/18 0405   Prevent/Manage Colorectal Surgical Infection   Fever Reduction/Comfort Measures lightweight bedding;medication administered;lightweight clothing   Safety Interventions   Infection Management aseptic technique maintained

## 2018-05-08 LAB
ANION GAP SERPL CALC-SCNC: 11 MMOL/L
BUN SERPL-MCNC: 9 MG/DL
CALCIUM SERPL-MCNC: 8.3 MG/DL
CHLORIDE SERPL-SCNC: 95 MMOL/L
CO2 SERPL-SCNC: 28 MMOL/L
CREAT SERPL-MCNC: 0.9 MG/DL
ERYTHROCYTE [DISTWIDTH] IN BLOOD BY AUTOMATED COUNT: 13.8 %
EST. GFR  (AFRICAN AMERICAN): >60 ML/MIN/1.73 M^2
EST. GFR  (NON AFRICAN AMERICAN): >60 ML/MIN/1.73 M^2
GLUCOSE SERPL-MCNC: 180 MG/DL
HCT VFR BLD AUTO: 37.4 %
HGB BLD-MCNC: 12.9 G/DL
MCH RBC QN AUTO: 29.3 PG
MCHC RBC AUTO-ENTMCNC: 34.5 G/DL
MCV RBC AUTO: 85 FL
PLATELET # BLD AUTO: 214 K/UL
PMV BLD AUTO: 10.7 FL
POCT GLUCOSE: 166 MG/DL (ref 70–110)
POCT GLUCOSE: 167 MG/DL (ref 70–110)
POCT GLUCOSE: 168 MG/DL (ref 70–110)
POCT GLUCOSE: 184 MG/DL (ref 70–110)
POTASSIUM SERPL-SCNC: 3.6 MMOL/L
RBC # BLD AUTO: 4.4 M/UL
SODIUM SERPL-SCNC: 134 MMOL/L
WBC # BLD AUTO: 8.24 K/UL

## 2018-05-08 PROCEDURE — 36415 COLL VENOUS BLD VENIPUNCTURE: CPT

## 2018-05-08 PROCEDURE — 94761 N-INVAS EAR/PLS OXIMETRY MLT: CPT

## 2018-05-08 PROCEDURE — 97804 MEDICAL NUTRITION GROUP: CPT

## 2018-05-08 PROCEDURE — 80048 BASIC METABOLIC PNL TOTAL CA: CPT

## 2018-05-08 PROCEDURE — 85027 COMPLETE CBC AUTOMATED: CPT

## 2018-05-08 PROCEDURE — 25000003 PHARM REV CODE 250: Performed by: INTERNAL MEDICINE

## 2018-05-08 PROCEDURE — S0077 INJECTION, CLINDAMYCIN PHOSP: HCPCS | Performed by: INTERNAL MEDICINE

## 2018-05-08 PROCEDURE — 11000001 HC ACUTE MED/SURG PRIVATE ROOM

## 2018-05-08 PROCEDURE — 63600175 PHARM REV CODE 636 W HCPCS: Performed by: INTERNAL MEDICINE

## 2018-05-08 RX ORDER — HYDROCODONE BITARTRATE AND ACETAMINOPHEN 5; 325 MG/1; MG/1
1 TABLET ORAL EVERY 4 HOURS PRN
Status: DISCONTINUED | OUTPATIENT
Start: 2018-05-08 | End: 2018-05-09 | Stop reason: HOSPADM

## 2018-05-08 RX ADMIN — PIPERACILLIN SODIUM AND TAZOBACTAM SODIUM 4.5 G: 2; .25 INJECTION, POWDER, FOR SOLUTION INTRAVENOUS at 03:05

## 2018-05-08 RX ADMIN — ONDANSETRON 4 MG: 2 INJECTION INTRAMUSCULAR; INTRAVENOUS at 02:05

## 2018-05-08 RX ADMIN — PIPERACILLIN SODIUM AND TAZOBACTAM SODIUM 4.5 G: 2; .25 INJECTION, POWDER, FOR SOLUTION INTRAVENOUS at 09:05

## 2018-05-08 RX ADMIN — GLIMEPIRIDE 4 MG: 2 TABLET ORAL at 07:05

## 2018-05-08 RX ADMIN — HYDROCODONE BITARTRATE AND ACETAMINOPHEN 1 TABLET: 5; 325 TABLET ORAL at 07:05

## 2018-05-08 RX ADMIN — CLINDAMYCIN IN 5 PERCENT DEXTROSE 900 MG: 18 INJECTION, SOLUTION INTRAVENOUS at 02:05

## 2018-05-08 RX ADMIN — MORPHINE SULFATE 2 MG: 4 INJECTION INTRAVENOUS at 02:05

## 2018-05-08 RX ADMIN — HYDROCODONE BITARTRATE AND ACETAMINOPHEN 1 TABLET: 5; 325 TABLET ORAL at 11:05

## 2018-05-08 RX ADMIN — KETOROLAC TROMETHAMINE 30 MG: 30 INJECTION, SOLUTION INTRAMUSCULAR; INTRAVENOUS at 06:05

## 2018-05-08 RX ADMIN — LISINOPRIL 10 MG: 10 TABLET ORAL at 09:05

## 2018-05-08 RX ADMIN — MORPHINE SULFATE 2 MG: 4 INJECTION INTRAVENOUS at 08:05

## 2018-05-08 RX ADMIN — KETOROLAC TROMETHAMINE 30 MG: 30 INJECTION, SOLUTION INTRAMUSCULAR; INTRAVENOUS at 11:05

## 2018-05-08 RX ADMIN — CLONIDINE HYDROCHLORIDE 0.2 MG: 0.1 TABLET ORAL at 03:05

## 2018-05-08 RX ADMIN — KETOROLAC TROMETHAMINE 30 MG: 30 INJECTION, SOLUTION INTRAMUSCULAR; INTRAVENOUS at 05:05

## 2018-05-08 RX ADMIN — CLINDAMYCIN IN 5 PERCENT DEXTROSE 900 MG: 18 INJECTION, SOLUTION INTRAVENOUS at 10:05

## 2018-05-08 RX ADMIN — ONDANSETRON 4 MG: 2 INJECTION INTRAMUSCULAR; INTRAVENOUS at 09:05

## 2018-05-08 RX ADMIN — METFORMIN HYDROCHLORIDE 500 MG: 500 TABLET ORAL at 07:05

## 2018-05-08 RX ADMIN — MORPHINE SULFATE 2 MG: 4 INJECTION INTRAVENOUS at 03:05

## 2018-05-08 RX ADMIN — PROMETHAZINE HYDROCHLORIDE 25 MG: 25 INJECTION INTRAMUSCULAR; INTRAVENOUS at 06:05

## 2018-05-08 RX ADMIN — CLINDAMYCIN IN 5 PERCENT DEXTROSE 900 MG: 18 INJECTION, SOLUTION INTRAVENOUS at 05:05

## 2018-05-08 RX ADMIN — ONDANSETRON 4 MG: 2 INJECTION INTRAMUSCULAR; INTRAVENOUS at 03:05

## 2018-05-08 RX ADMIN — KETOROLAC TROMETHAMINE 30 MG: 30 INJECTION, SOLUTION INTRAMUSCULAR; INTRAVENOUS at 12:05

## 2018-05-08 RX ADMIN — METFORMIN HYDROCHLORIDE 500 MG: 500 TABLET ORAL at 06:05

## 2018-05-08 RX ADMIN — RANITIDINE 300 MG: 15 SYRUP ORAL at 06:05

## 2018-05-08 RX ADMIN — CLONIDINE HYDROCHLORIDE 0.2 MG: 0.1 TABLET ORAL at 07:05

## 2018-05-08 RX ADMIN — MORPHINE SULFATE 2 MG: 4 INJECTION INTRAVENOUS at 09:05

## 2018-05-08 NOTE — NURSING
"1745-CALL DR. FISH REGARDING PT VOMITING AND REQUESTING SOMETHING FOR INDIGESTION.  ORDERS RECEIVED FOR PHENERGAN 25MG IVPB Q 6 HOUR AND ZANTAC 300MG NOW AND THEN DAILY, RBV. JEREMIAH,RN   1750-ATTEMPTED TO GIVE ORDER MEDS, PT STATES, "I NEED TO WALK AROUND." NAD NOTED. RESP EVEN AND UNLABORED. WILL CONT TO MONITOR. JEREMIAHRN    1800- PT BACK IN ROOM. STATES, "IM READY FOR THE MEDICATIONS."JEREMIAHRN   "

## 2018-05-08 NOTE — OP NOTE
DATE OF PROCEDURE:  05/07/2018.    DIAGNOSIS:  Left submandibular submental abscess probably secondary to  dental etiology    PROCEDURE:  I and D of left submandibular abscess.    Please refer to anesthetic record for detail.    An adequate level of anesthesia was obtained.  The patient was prepped and  draped in the usual manner for intraoral procedure.  Local anesthesia  consisting of 2% Xylocaine with epinephrine was administered by site  infiltration and a submandibular incision was made.  The I and D was  performed.  Multiple cultures were taken for aerobic and anaerobic study.   The Penrose drain was placed as deep as possible on the left mandible and  secured with a 3-0 chromic suture.  The CT scan taken prior to surgery was  not diagnostic as far as a dental etiology.  Therefore, tomorrow, we will  probably get plain films to determine which tooth needs to be extracted.  I  would like to extract the offending tooth prior to his leaving the  hospital.        RKA/IN dd: 05/07/2018 13:46:20 (CDT)   td: 05/07/2018 19:31:45 (CDT)  Doc ID #[id number]   Job ID #[id number]    CC:

## 2018-05-08 NOTE — PLAN OF CARE
Problem: Skin and Soft Tissue Infection (Adult)  Intervention: Control Infection Risk   05/08/18 1036   Safety Interventions   Infection Prevention gylcemic control management;hydration promoted;nutrition promoted;equipment surfaces disinfected;barrier precautions utilized;personal protective equipment utilized   Pain/Comfort/Sleep Interventions   Sleep/Rest Enhancement regular sleep/rest pattern promoted;relaxation techniques promoted

## 2018-05-08 NOTE — NURSING
"PT EATING MCDONALDS, INFORMED PT OF DIABETIC ORDER, AND BLOOD GLUCOSE MONITORING. PT CONT TO EAT, STATES, "IM EATING THIS OR GOING HOME. "  INFORMED RISKS OF ELEVATED SUGAR, ETC. PT VOICED UNDERSTANDING.   "

## 2018-05-08 NOTE — PHYSICIAN QUERY
PT Name: Leonila Simon  MR #: [id number]     Physician Query Form - Documentation Clarification      CDS/: Carley GREENWOOD, RN, CCDS              Contact information: [email or web address]    This form is a permanent document in the medical record.     Query Date: May 8, 2018    By submitting this query, we are merely seeking further clarification of documentation. Please utilize your independent clinical judgment when addressing the question(s) below.    The Medical record reflects the following:    Supporting Clinical Findings Location in Medical Record      Diabetes 1.5, managed as type 2     Place on Sliding scale and keep NPO until after seeing Dr. Nicole      [facility] Medicine H&P 5/6/18      DM - admit glucose >400; start LOVELL today in addition to home metformin.  Non compliant with home meds and diet (drinks lots of coke). A1C 9%.  Dietary review.       [facility] Medicine Progress 5/8/18                                                                            Doctor, Please specify diagnosis or diagnoses associated with above clinical findings.    Provider Use Only       [  ] Diabetes 1.5, managed as type 2 with hyperglycemia     [ x ] Other related diagnosis (please specify):uncontrolled DM2__________________                                                                                                               [  ] Clinically undetermined

## 2018-05-08 NOTE — PROGRESS NOTES
"Up in chair, ambulating room.  Tolerating liquids, no swallowing problems.  Pain increased this AM.    Vitals:    05/07/18 1952 05/07/18 2301 05/08/18 0253 05/08/18 0711   BP: 124/65 124/77 (!) 165/96 (!) 110/59   BP Location: Left arm Left arm Left arm Right arm   Patient Position: Lying Lying Lying Lying   Pulse: 85 87 94 89   Resp: 20 20 20 18   Temp: 98.1 °F (36.7 °C) 97.4 °F (36.3 °C) 98.4 °F (36.9 °C) 98.4 °F (36.9 °C)   TempSrc: Oral Oral Oral Oral   SpO2: (!) 94% (!) 94% 98% 96%   Weight:       Height:         Alert x 4, talking easily  Thick neck with moderate Left mandibular edema involving mild left malena-orbital edema - imppoved; Drain in place left mid mandible  Regular  Clear  Obese and protuberant, benign  Trace edema  Non focal    A/P  1.Oral Abscess - POD#1 I&D with cultures pending.  Zosyn/clinda D#3, WBC normalized.  CT "Intramuscular abscess within the left masseter muscle, likely associated with left mandibular 3rd molar periapical abscess". Mandibular xray today with likely tooth extraction after swelling subsides per Dr Bright.      2.DM - admit glucose >400; start LOVELL today in addition to home metformin.  Non compliant with home meds and diet (drinks lots of coke). A1C 9%.  Dietary review.    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

## 2018-05-09 VITALS
OXYGEN SATURATION: 98 % | BODY MASS INDEX: 42 KG/M2 | WEIGHT: 300 LBS | DIASTOLIC BLOOD PRESSURE: 103 MMHG | TEMPERATURE: 98 F | HEIGHT: 71 IN | SYSTOLIC BLOOD PRESSURE: 150 MMHG | RESPIRATION RATE: 20 BRPM | HEART RATE: 96 BPM

## 2018-05-09 PROBLEM — E11.9 TYPE 2 DIABETES MELLITUS, WITHOUT LONG-TERM CURRENT USE OF INSULIN: Chronic | Status: ACTIVE | Noted: 2018-05-06

## 2018-05-09 PROBLEM — E66.01 MORBID OBESITY DUE TO EXCESS CALORIES: Chronic | Status: ACTIVE | Noted: 2018-05-09

## 2018-05-09 LAB — POCT GLUCOSE: 166 MG/DL (ref 70–110)

## 2018-05-09 PROCEDURE — 25000003 PHARM REV CODE 250: Performed by: INTERNAL MEDICINE

## 2018-05-09 PROCEDURE — S0077 INJECTION, CLINDAMYCIN PHOSP: HCPCS | Performed by: INTERNAL MEDICINE

## 2018-05-09 PROCEDURE — 63600175 PHARM REV CODE 636 W HCPCS: Performed by: INTERNAL MEDICINE

## 2018-05-09 RX ORDER — LISINOPRIL AND HYDROCHLOROTHIAZIDE 12.5; 2 MG/1; MG/1
1 TABLET ORAL DAILY
Qty: 30 TABLET | Refills: 0 | Status: SHIPPED | OUTPATIENT
Start: 2018-05-09 | End: 2019-05-09

## 2018-05-09 RX ORDER — AMOXICILLIN AND CLAVULANATE POTASSIUM 875; 125 MG/1; MG/1
1 TABLET, FILM COATED ORAL EVERY 12 HOURS
Qty: 12 TABLET | Refills: 0 | Status: SHIPPED | OUTPATIENT
Start: 2018-05-09

## 2018-05-09 RX ORDER — DICLOFENAC SODIUM 75 MG/1
75 TABLET, DELAYED RELEASE ORAL 2 TIMES DAILY
Qty: 14 TABLET | Refills: 0 | Status: SHIPPED | OUTPATIENT
Start: 2018-05-09 | End: 2018-05-16

## 2018-05-09 RX ORDER — GLIMEPIRIDE 4 MG/1
4 TABLET ORAL
Qty: 30 TABLET | Refills: 0 | Status: SHIPPED | OUTPATIENT
Start: 2018-05-10 | End: 2019-05-10

## 2018-05-09 RX ORDER — TRAMADOL HYDROCHLORIDE 50 MG/1
50 TABLET ORAL EVERY 4 HOURS PRN
Qty: 12 TABLET | Refills: 0 | Status: SHIPPED | OUTPATIENT
Start: 2018-05-09 | End: 2018-05-19

## 2018-05-09 RX ADMIN — CLINDAMYCIN IN 5 PERCENT DEXTROSE 900 MG: 18 INJECTION, SOLUTION INTRAVENOUS at 05:05

## 2018-05-09 RX ADMIN — PIPERACILLIN SODIUM AND TAZOBACTAM SODIUM 4.5 G: 2; .25 INJECTION, POWDER, FOR SOLUTION INTRAVENOUS at 04:05

## 2018-05-09 RX ADMIN — LISINOPRIL 10 MG: 10 TABLET ORAL at 07:05

## 2018-05-09 RX ADMIN — HYDROCODONE BITARTRATE AND ACETAMINOPHEN 1 TABLET: 5; 325 TABLET ORAL at 11:05

## 2018-05-09 RX ADMIN — ONDANSETRON 4 MG: 2 INJECTION INTRAMUSCULAR; INTRAVENOUS at 08:05

## 2018-05-09 RX ADMIN — METFORMIN HYDROCHLORIDE 500 MG: 500 TABLET ORAL at 07:05

## 2018-05-09 RX ADMIN — PIPERACILLIN SODIUM AND TAZOBACTAM SODIUM 4.5 G: 2; .25 INJECTION, POWDER, FOR SOLUTION INTRAVENOUS at 10:05

## 2018-05-09 RX ADMIN — MORPHINE SULFATE 2 MG: 4 INJECTION INTRAVENOUS at 06:05

## 2018-05-09 RX ADMIN — GLIMEPIRIDE 4 MG: 2 TABLET ORAL at 07:05

## 2018-05-09 RX ADMIN — CLONIDINE HYDROCHLORIDE 0.2 MG: 0.1 TABLET ORAL at 07:05

## 2018-05-09 NOTE — DISCHARGE SUMMARY
"[facility] - Med Surg  Hospital Medicine  Discharge Summary      Patient Name: Leonila Simon  MRN: [id number]  Admission Date: 5/6/2018  Hospital Length of Stay: 3 days  Discharge Date and Time:  05/09/2018 11:45 AM  Attending Physician: Miguel Angel Blackwell MD   Discharging Provider: Dean Yee MD  Primary Care Provider: Primary Doctor No      HPI:   Patient presents with a 4 day history of swelling to the left jaw and pain.   He stated that he had something similar 3 months ago that started draining on its own.   That subsequently healed and now the left jaw has been increasing in size for the past 4 days.  He states that it is starting to hurt under his chin to the right.  CT scan shows significant abscess of the Left masseter muscle.    Procedure(s) (LRB):  INCISION AND DRAINAGE (I & D)-ABSCESS (Left)      Hospital Course:   1.Oral Abscess - POD#2 I&D with cultures NGTD (on Abx prior to I&D). Received 4 days Zosyn/clinda, change Augmentin to complete 10 day course, WBC normalized.  CT "Intramuscular abscess within the left masseter muscle, likely associated with left mandibular 3rd molar periapical abscess". Case d/w Dr Bright and unable to localize offending tooth on xray.  He will review in office next week with likely outpt tooth extraction - I explained this to Leonila and family and STRESSED importance of this follow up.     2.DM - admit glucose >400; started amaryl in addition to home metformin.  Non compliant with home meds and diet (drinks lots of coke). A1C 9%.  Dietary review.  Told him bad things real soon unless lifestyle changes.     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; lectured     5.HTN - likely long standing started combo pill to help with compliance (stop BB as not taking anyway)    6.Tobacco abuse - lectured      Final Active Diagnoses:    Diagnosis Date Noted POA    PRINCIPAL PROBLEM:  Abscess of neck [L02.11] " 05/06/2018 Yes    Morbid obesity due to excess calories [E66.01] 05/09/2018 Yes     Chronic    Facial abscess [L02.01] 05/07/2018 Yes    Type 2 diabetes mellitus, without long-term current use of insulin [E11.9] 05/06/2018 Yes     Chronic    Essential hypertension [I10] 05/06/2018 Yes      Problems Resolved During this Admission:    Diagnosis Date Noted Date Resolved POA       Discharged Condition: good    Disposition: Home or Self Care    Follow Up:  Follow-up Information     Osmar Bright DDS. Go on 5/14/2018.    Specialty:  Oral and Maxillofacial Surgery  Why:  appt time is 0900 please arrive by 0845 fasting for 6 hours prior and must have a  to drive you home  Contact information:  [address] [place] [address] [phone]             Yin Howell NP In 3 weeks.    Specialty:  Family Medicine  Why:  for DM and HTN  Contact information:  [address] [place] [address] [phone]                 Patient Instructions:     Diet diabetic     Diet Cardiac     Activity as tolerated   Order Comments: Soap and water to left jaw daily with light gauze band aid       Medications:  Reconciled Home Medications:      Medication List      START taking these medications    amoxicillin-clavulanate 875-125mg 875-125 mg per tablet  Commonly known as:  AUGMENTIN  Take 1 tablet by mouth every 12 (twelve) hours. For infection     diclofenac 75 MG EC tablet  Commonly known as:  VOLTAREN  Take 1 tablet (75 mg total) by mouth 2 (two) times daily. For pain and swelling     glimepiride 4 MG tablet  Commonly known as:  AMARYL  Take 1 tablet (4 mg total) by mouth daily with breakfast. For diabetes  Start taking on:  5/10/2018     lisinopril-hydrochlorothiazide 20-12.5 mg per tablet  Commonly known as:  PRINZIDE,ZESTORETIC  Take 1 tablet by mouth once daily. For blood pressure     traMADol 50 mg tablet  Commonly known as:  ULTRAM  Take 1 tablet (50 mg total) by mouth every 4 (four) hours as needed  for Pain.        CONTINUE taking these medications    metFORMIN 500 MG tablet  Commonly known as:  GLUCOPHAGE  Take 500 mg by mouth 2 (two) times daily with meals.        STOP taking these medications    METOPROLOL SUCCINATE ORAL            Time spent on the discharge of patient: 35 minutes  Patient was seen and examined on the date of discharge and determined to be suitable for discharge.    Dean Yee MD  Department of Hospital Medicine  [facility] Surg

## 2018-05-09 NOTE — NURSING
1400>Pt ambulates c son after verb unds of dc instructions, given work excuse and prescriptions. Counseled pt on watching his blood pressure, blood sugar, and dressing change instructions. IV dced rfa, site unremarkable. Bandage and pressure applied. Pt provided wound care supplies. Pt and son thank nurse and leave unit to MIREYA GUTHRIE

## 2018-05-09 NOTE — PLAN OF CARE
CM rounded on patient to provide new patient paperwork for Dr. Bright's office and advised patient of appt time for 5/10 for dental xray and extraction. CM provided education regarding appt date and time, what will happen at appt and gave financial sheet from office. CM provided education on extreme importance of keeping appointment and taking all antibiotics prescribed as ordered. Patient and mother at bedside both verbalized understanding of all education provided.

## 2018-05-09 NOTE — PLAN OF CARE
Correction per Dr. Yee, Dr Bright wants swelling to go down before procedure and appt on 5/10 is too soon. Appt rescheduled for Monday 5/14 pt and mother made aware of appt date time change 5/14 0900

## 2018-05-09 NOTE — PROGRESS NOTES
"Ambulating halls, asking when can go home.  Tolerating diet, no swallowing problems.  Pain controlled.    Vitals:    05/08/18 1413 05/08/18 1914 05/09/18 0335 05/09/18 0736   BP: 133/71 (!) 155/96 135/85 (!) 150/103   BP Location: Right arm Left arm Left arm Left arm   Patient Position: Lying Lying Lying Sitting   Pulse: 88 102 90 96   Resp: 18 20 20 20   Temp: 98 °F (36.7 °C) 97.6 °F (36.4 °C) 98.4 °F (36.9 °C) 97.7 °F (36.5 °C)   TempSrc: Oral Oral Oral Axillary   SpO2: 97% 95% (!) 94% 98%   Weight:       Height:         Alert x 4, talking easily  Thick neck with moderate Left mandibular edema involving mild left malena-orbital edema - much improved today; Drain in place left mid mandible  Regular  Clear  Obese and protuberant, benign  Trace edema  Non focal    A/P  1.Oral Abscess - POD#2 I&D with cultures pending.  Zosyn/clinda D#4 - change Augmentin soon to complete 10 day course, WBC normalized.  CT "Intramuscular abscess within the left masseter muscle, likely associated with left mandibular 3rd molar periapical abscess". Case d/w Dr Bright and unable to localize offending tooth on xray.  He will review in office next week with outpt likely tooth extraction - I explained this to Leonila and STRESSED importance of this follow up.    2.DM - admit glucose >400; start LOVELL in addition to home metformin.  Non compliant with home meds and diet (drinks lots of coke). A1C 9%.  Dietary review.    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

## 2018-05-10 LAB — BACTERIA SPEC AEROBE CULT: NORMAL

## 2018-05-11 LAB — BACTERIA BLD CULT: NORMAL

## 2018-05-14 LAB — BACTERIA SPEC ANAEROBE CULT: NORMAL

## (undated) DEVICE — DRESSING SPONGE 16PLY 4X4 NS

## (undated) DEVICE — TAPE MEDIPORE 4IN X 2YDS

## (undated) DEVICE — COVER LIGHT HANDLE 80/CA

## (undated) DEVICE — GLOVE PROTEXIS PI SYN SURG 8.0

## (undated) DEVICE — NDL SAFETY 25G X 1.5 ECLIPSE

## (undated) DEVICE — GLOVE SURG ULTRA TOUCH 7.5

## (undated) DEVICE — SUT CHROMIC 3-0 SH 27IN GUT

## (undated) DEVICE — DRAIN PENROSE XRAY 18 X 1/4 ST

## (undated) DEVICE — SYR B-D DISP CONTROL 10CC100/C

## (undated) DEVICE — SOL NACL IRR 1000ML BTL

## (undated) DEVICE — CULTSWAB+ AMIES W/O CHARC SNG

## (undated) DEVICE — PACK NASAL SINUS

## (undated) DEVICE — GLOVE 7.5 PROTEXIS PI MICRO

## (undated) DEVICE — CANISTER SUCTION 3000CC

## (undated) DEVICE — BLADE SURG #15 CARBON STEEL